# Patient Record
Sex: FEMALE | Race: OTHER | NOT HISPANIC OR LATINO | URBAN - METROPOLITAN AREA
[De-identification: names, ages, dates, MRNs, and addresses within clinical notes are randomized per-mention and may not be internally consistent; named-entity substitution may affect disease eponyms.]

---

## 2017-02-01 ENCOUNTER — OUTPATIENT (OUTPATIENT)
Dept: OUTPATIENT SERVICES | Facility: HOSPITAL | Age: 52
LOS: 1 days | Discharge: HOME | End: 2017-02-01

## 2017-06-27 DIAGNOSIS — R70.0 ELEVATED ERYTHROCYTE SEDIMENTATION RATE: ICD-10-CM

## 2017-06-27 DIAGNOSIS — D50.0 IRON DEFICIENCY ANEMIA SECONDARY TO BLOOD LOSS (CHRONIC): ICD-10-CM

## 2017-07-28 ENCOUNTER — EMERGENCY (EMERGENCY)
Facility: HOSPITAL | Age: 52
LOS: 0 days | Discharge: HOME | End: 2017-07-28
Admitting: INTERNAL MEDICINE

## 2017-07-28 DIAGNOSIS — G62.9 POLYNEUROPATHY, UNSPECIFIED: ICD-10-CM

## 2017-07-28 DIAGNOSIS — Z90.722 ACQUIRED ABSENCE OF OVARIES, BILATERAL: ICD-10-CM

## 2021-08-02 PROBLEM — Z00.00 ENCOUNTER FOR PREVENTIVE HEALTH EXAMINATION: Status: ACTIVE | Noted: 2021-08-02

## 2021-08-25 ENCOUNTER — APPOINTMENT (OUTPATIENT)
Dept: NEUROLOGY | Facility: CLINIC | Age: 56
End: 2021-08-25
Payer: COMMERCIAL

## 2021-08-25 VITALS
BODY MASS INDEX: 31.41 KG/M2 | HEART RATE: 83 BPM | SYSTOLIC BLOOD PRESSURE: 133 MMHG | WEIGHT: 184 LBS | DIASTOLIC BLOOD PRESSURE: 87 MMHG | TEMPERATURE: 97.8 F | OXYGEN SATURATION: 99 % | HEIGHT: 64 IN

## 2021-08-25 DIAGNOSIS — Z82.0 FAMILY HISTORY OF EPILEPSY AND OTHER DISEASES OF THE NERVOUS SYSTEM: ICD-10-CM

## 2021-08-25 PROCEDURE — 99203 OFFICE O/P NEW LOW 30 MIN: CPT

## 2021-08-25 NOTE — PHYSICAL EXAM
[General Appearance - Alert] : alert [General Appearance - In No Acute Distress] : in no acute distress [General Appearance - Well Nourished] : well nourished [General Appearance - Well Developed] : well developed [Oriented To Time, Place, And Person] : oriented to person, place, and time [Affect] : the affect was normal [Naming Objects] : no difficulty naming common objects [Fluency] : fluency intact [Comprehension] : comprehension intact [Cranial Nerves Optic (II)] : visual acuity intact bilaterally,  visual fields full to confrontation, pupils equal round and reactive to light [Cranial Nerves Oculomotor (III)] : extraocular motion intact [Cranial Nerves Trigeminal (V)] : facial sensation intact symmetrically [Cranial Nerves Facial (VII)] : face symmetrical [Cranial Nerves Vestibulocochlear (VIII)] : hearing was intact bilaterally [Cranial Nerves Glossopharyngeal (IX)] : tongue and palate midline [Cranial Nerves Accessory (XI - Cranial And Spinal)] : head turning and shoulder shrug symmetric [Cranial Nerves Hypoglossal (XII)] : there was no tongue deviation with protrusion [Motor Tone] : muscle tone was normal in all four extremities [Motor Strength] : muscle strength was normal in all four extremities [Motor Handedness Right-Handed] : the patient is right hand dominant [Paresis Pronator Drift Right-Sided] : no pronator drift on the right [Paresis Pronator Drift Left-Sided] : no pronator drift on the left [Motor Strength Upper Extremities Bilaterally] : strength was normal in both upper extremities [Motor Strength Lower Extremities Bilaterally] : strength was normal in both lower extremities [Sensation Tactile Decrease] : light touch was intact [Abnormal Walk] : normal gait [Coordination - Dysmetria Impaired Finger-to-Nose Bilateral] : not present [Coordination - Dysmetria Impaired Heel-to-Shin Bilateral] : not present [2+] : Patella left 2+ [Sclera] : the sclera and conjunctiva were normal [Optic Disc Abnormality] : the optic disc were normal in size and color [Hearing Threshold Finger Rub Not [place]] : hearing was normal [Neck Appearance] : the appearance of the neck was normal [] : the neck was supple [Respiration, Rhythm And Depth] : normal respiratory rhythm and effort [Auscultation Breath Sounds / Voice Sounds] : lungs were clear to auscultation bilaterally [Heart Rate And Rhythm] : heart rate was normal and rhythm regular [Heart Sounds] : normal S1 and S2 [Arterial Pulses Carotid] : carotid pulses were normal with no bruits [Edema] : there was no peripheral edema [Musculoskeletal - Swelling] : no joint swelling seen [Skin Turgor] : normal skin turgor

## 2021-11-23 ENCOUNTER — NON-APPOINTMENT (OUTPATIENT)
Age: 56
End: 2021-11-23

## 2022-01-17 ENCOUNTER — RX RENEWAL (OUTPATIENT)
Age: 57
End: 2022-01-17

## 2022-03-16 ENCOUNTER — APPOINTMENT (OUTPATIENT)
Dept: NEUROLOGY | Facility: CLINIC | Age: 57
End: 2022-03-16

## 2022-07-21 ENCOUNTER — APPOINTMENT (OUTPATIENT)
Dept: NEUROLOGY | Facility: CLINIC | Age: 57
End: 2022-07-21

## 2022-07-21 VITALS
TEMPERATURE: 97.6 F | SYSTOLIC BLOOD PRESSURE: 129 MMHG | HEIGHT: 64 IN | HEART RATE: 77 BPM | BODY MASS INDEX: 31.58 KG/M2 | OXYGEN SATURATION: 99 % | WEIGHT: 185 LBS | DIASTOLIC BLOOD PRESSURE: 86 MMHG

## 2022-07-21 PROCEDURE — 99213 OFFICE O/P EST LOW 20 MIN: CPT

## 2022-07-21 RX ORDER — ESTRADIOL 0.1 MG/G
0.1 CREAM VAGINAL
Qty: 42 | Refills: 0 | Status: ACTIVE | COMMUNITY
Start: 2022-02-23

## 2022-07-21 NOTE — DISCUSSION/SUMMARY
[FreeTextEntry1] : Pt is a 56 yo F with PMhx of migraines, who presents for evaluation/management of migraine headaches. \par \par # Episodic migraine without status migrainosus. Reports good response to ubrelvy.\par - refill script for ubrelvy 50mg prn, will need new PA\par \par RTC in 12 mo

## 2022-07-21 NOTE — PHYSICAL EXAM
[General Appearance - Alert] : alert [General Appearance - In No Acute Distress] : in no acute distress [General Appearance - Well Nourished] : well nourished [General Appearance - Well Developed] : well developed [Oriented To Time, Place, And Person] : oriented to person, place, and time [Affect] : the affect was normal [Person] : oriented to person [Place] : oriented to place [Time] : oriented to time [Fluency] : fluency intact [Comprehension] : comprehension intact [Cranial Nerves Optic (II)] : visual acuity intact bilaterally,  visual fields full to confrontation, pupils equal round and reactive to light [Cranial Nerves Oculomotor (III)] : extraocular motion intact [Cranial Nerves Trigeminal (V)] : facial sensation intact symmetrically [Cranial Nerves Facial (VII)] : face symmetrical [Cranial Nerves Vestibulocochlear (VIII)] : hearing was intact bilaterally [Cranial Nerves Glossopharyngeal (IX)] : tongue and palate midline [Cranial Nerves Accessory (XI - Cranial And Spinal)] : head turning and shoulder shrug symmetric [Cranial Nerves Hypoglossal (XII)] : there was no tongue deviation with protrusion [Motor Tone] : muscle tone was normal in all four extremities [Motor Strength] : muscle strength was normal in all four extremities [Paresis Pronator Drift Right-Sided] : no pronator drift on the right [Paresis Pronator Drift Left-Sided] : no pronator drift on the left [Motor Strength Upper Extremities Bilaterally] : strength was normal in both upper extremities [Motor Strength Lower Extremities Bilaterally] : strength was normal in both lower extremities [Sensation Tactile Decrease] : light touch was intact [Coordination - Dysmetria Impaired Finger-to-Nose Bilateral] : not present [2+] : Patella left 2+ [Sclera] : the sclera and conjunctiva were normal [PERRL With Normal Accommodation] : pupils were equal in size, round, reactive to light, with normal accommodation [Outer Ear] : the ears and nose were normal in appearance [Hearing Threshold Finger Rub Not [place]] : hearing was normal [Neck Appearance] : the appearance of the neck was normal [] : no respiratory distress [Respiration, Rhythm And Depth] : normal respiratory rhythm and effort [Heart Rate And Rhythm] : heart rate was normal and rhythm regular [Heart Sounds] : normal S1 and S2 [Arterial Pulses Carotid] : carotid pulses were normal with no bruits [Abnormal Walk] : normal gait [Skin Turgor] : normal skin turgor

## 2022-07-21 NOTE — HISTORY OF PRESENT ILLNESS
[FreeTextEntry1] : Interval History:\par Pt endorses doing well. Infrequent migraines, about 2-4/mo. Ubrelvy has been aborting them well. denies any new complaints. \par \par HPI: Pt is a 54 yo F with PMhx of migraines, who presents for evaluation/management of migraine headaches. States that she has had them for over 20 years. Describes them as behind her right eye/temple, pulsating in quality. Worse with dairy and if she misses meals. Migraines have improved since cutting out dairy from her diet. They occur about 3 times a month, last less than a day. She has tried fioricet but doesn't like to take it more than once every other week. She recently tried ubrelvy and it was quite helpful without any side effects. Denies weakness, numbness, vision changes, speech difficulty or falls.

## 2022-07-25 ENCOUNTER — TRANSCRIPTION ENCOUNTER (OUTPATIENT)
Age: 57
End: 2022-07-25

## 2022-11-28 NOTE — HISTORY OF PRESENT ILLNESS
[2+] : left 2+ [Ankle Swelling (On Exam)] : present [FreeTextEntry1] : Pt is a 56 yo F with PMhx of migraines, who presents for evaluation/management of migraine headaches. States that she has had them for over 20 years. Describes them as behind her right eye/temple, pulsating in quality. Worse with dairy and if she misses meals. Migraines have improved since cutting out dairy from her diet. They occur about 3 times a month, last less than a day. She has tried fioricet but doesn't like to take it more than once every other week. She recently tried ubrelvy and it was quite helpful without any side effects. Denies weakness, numbness, vision changes, speech difficulty or falls.  [Varicose Veins Of Lower Extremities] : present [Varicose Veins Of The Right Leg] : of the right leg [Ankle Swelling On The Left] : moderate [] : of the left leg [Ankle Swelling Bilaterally] : severe [Alert] : alert [Oriented to Person] : oriented to person [Oriented to Place] : oriented to place [Oriented to Time] : oriented to time [Calm] : calm [Skin Ulcer] : no ulcer [de-identified] : NAD. well appearing

## 2023-02-21 ENCOUNTER — RX RENEWAL (OUTPATIENT)
Age: 58
End: 2023-02-21

## 2023-07-21 ENCOUNTER — APPOINTMENT (OUTPATIENT)
Dept: NEUROLOGY | Facility: CLINIC | Age: 58
End: 2023-07-21
Payer: COMMERCIAL

## 2023-07-21 VITALS
HEART RATE: 79 BPM | WEIGHT: 190 LBS | OXYGEN SATURATION: 99 % | HEIGHT: 64 IN | TEMPERATURE: 97.5 F | SYSTOLIC BLOOD PRESSURE: 133 MMHG | DIASTOLIC BLOOD PRESSURE: 83 MMHG | BODY MASS INDEX: 32.44 KG/M2

## 2023-07-21 DIAGNOSIS — G43.909 MIGRAINE, UNSPECIFIED, NOT INTRACTABLE, W/OUT STATUS MIGRAINOSUS: ICD-10-CM

## 2023-07-21 PROCEDURE — 99213 OFFICE O/P EST LOW 20 MIN: CPT

## 2023-07-21 NOTE — HISTORY OF PRESENT ILLNESS
[FreeTextEntry1] : Interval History:\par Pt endorses feeling well, uses migraine abortive approx once a week. Denies any recent changes to migraine severity/quality/frequency.\par \par HPI: Pt is a 54 yo F with PMhx of migraines, who presents for evaluation/management of migraine headaches. States that she has had them for over 20 years. Describes them as behind her right eye/temple, pulsating in quality. Worse with dairy and if she misses meals. Migraines have improved since cutting out dairy from her diet. They occur about 3 times a month, last less than a day. She has tried fioricet but doesn't like to take it more than once every other week. She recently tried ubrelvy and it was quite helpful without any side effects. Denies weakness, numbness, vision changes, speech difficulty or falls. \par \par 07/2022: Pt endorses doing well. Infrequent migraines, about 2-4/mo. Ubrelvy has been aborting them well. denies any new complaints

## 2023-07-21 NOTE — DISCUSSION/SUMMARY
[FreeTextEntry1] : Pt is a 58 yo F with PMhx of migraines, who presents for migraine headache f/u. \par \par # Episodic migraine without status migrainosus: continued good response to ubrelvy.\par - refill script for ubrelvy 50mg prn\par \par \par RTC in 12 mo.

## 2023-07-21 NOTE — PHYSICAL EXAM
[General Appearance - Alert] : alert [General Appearance - In No Acute Distress] : in no acute distress [General Appearance - Well Nourished] : well nourished [General Appearance - Well Developed] : well developed [Oriented To Time, Place, And Person] : oriented to person, place, and time [Affect] : the affect was normal [Person] : oriented to person [Place] : oriented to place [Time] : oriented to time [Fluency] : fluency intact [Comprehension] : comprehension intact [Cranial Nerves Optic (II)] : visual acuity intact bilaterally,  visual fields full to confrontation, pupils equal round and reactive to light [Cranial Nerves Oculomotor (III)] : extraocular motion intact [Cranial Nerves Trigeminal (V)] : facial sensation intact symmetrically [Cranial Nerves Facial (VII)] : face symmetrical [Cranial Nerves Vestibulocochlear (VIII)] : hearing was intact bilaterally [Cranial Nerves Glossopharyngeal (IX)] : tongue and palate midline [Cranial Nerves Accessory (XI - Cranial And Spinal)] : head turning and shoulder shrug symmetric [Cranial Nerves Hypoglossal (XII)] : there was no tongue deviation with protrusion [Motor Tone] : muscle tone was normal in all four extremities [Motor Strength] : muscle strength was normal in all four extremities [Sensation Tactile Decrease] : light touch was intact [Abnormal Walk] : normal gait [Coordination - Dysmetria Impaired Finger-to-Nose Bilateral] : not present [2+] : Patella left 2+

## 2023-07-24 RX ORDER — UBROGEPANT 50 MG/1
TABLET ORAL
Qty: 48 | Refills: 3 | Status: ACTIVE | COMMUNITY
Start: 2021-08-25 | End: 1900-01-01

## 2024-07-19 ENCOUNTER — NON-APPOINTMENT (OUTPATIENT)
Age: 59
End: 2024-07-19

## 2024-07-19 ENCOUNTER — APPOINTMENT (OUTPATIENT)
Dept: NEUROLOGY | Facility: CLINIC | Age: 59
End: 2024-07-19
Payer: COMMERCIAL

## 2024-07-19 VITALS
BODY MASS INDEX: 33.63 KG/M2 | RESPIRATION RATE: 18 BRPM | HEIGHT: 64 IN | OXYGEN SATURATION: 99 % | DIASTOLIC BLOOD PRESSURE: 72 MMHG | TEMPERATURE: 97.5 F | WEIGHT: 197 LBS | HEART RATE: 80 BPM | SYSTOLIC BLOOD PRESSURE: 105 MMHG

## 2024-07-19 DIAGNOSIS — G43.909 MIGRAINE, UNSPECIFIED, NOT INTRACTABLE, W/OUT STATUS MIGRAINOSUS: ICD-10-CM

## 2024-07-19 PROCEDURE — 99213 OFFICE O/P EST LOW 20 MIN: CPT

## 2025-05-30 ENCOUNTER — APPOINTMENT (OUTPATIENT)
Facility: CLINIC | Age: 60
End: 2025-05-30

## 2025-05-30 DIAGNOSIS — M25.561 PAIN IN RIGHT KNEE: ICD-10-CM

## 2025-05-30 DIAGNOSIS — M25.511 PAIN IN RIGHT SHOULDER: ICD-10-CM

## 2025-05-30 PROCEDURE — 73562 X-RAY EXAM OF KNEE 3: CPT | Mod: 50

## 2025-05-30 PROCEDURE — 99204 OFFICE O/P NEW MOD 45 MIN: CPT | Mod: 25

## 2025-05-30 PROCEDURE — 73030 X-RAY EXAM OF SHOULDER: CPT | Mod: RT

## 2025-05-30 RX ORDER — MELOXICAM 15 MG/1
15 TABLET ORAL
Qty: 30 | Refills: 0 | Status: ACTIVE | COMMUNITY
Start: 2025-05-30 | End: 1900-01-01

## 2025-06-26 ENCOUNTER — APPOINTMENT (OUTPATIENT)
Dept: ORTHOPEDIC SURGERY | Facility: CLINIC | Age: 60
End: 2025-06-26

## 2025-07-31 ENCOUNTER — APPOINTMENT (OUTPATIENT)
Dept: ORTHOPEDIC SURGERY | Facility: CLINIC | Age: 60
End: 2025-07-31

## 2025-09-04 ENCOUNTER — APPOINTMENT (OUTPATIENT)
Dept: ORTHOPEDIC SURGERY | Facility: CLINIC | Age: 60
End: 2025-09-04
Payer: COMMERCIAL

## 2025-09-04 DIAGNOSIS — M25.561 PAIN IN RIGHT KNEE: ICD-10-CM

## 2025-09-04 PROCEDURE — 99213 OFFICE O/P EST LOW 20 MIN: CPT

## 2025-09-05 ENCOUNTER — EMERGENCY (EMERGENCY)
Facility: HOSPITAL | Age: 60
LOS: 0 days | Discharge: HOME | End: 2025-09-06
Attending: EMERGENCY MEDICINE
Payer: COMMERCIAL

## 2025-09-05 VITALS
DIASTOLIC BLOOD PRESSURE: 69 MMHG | RESPIRATION RATE: 18 BRPM | HEART RATE: 107 BPM | OXYGEN SATURATION: 99 % | WEIGHT: 195.33 LBS | HEIGHT: 63 IN | TEMPERATURE: 98 F | SYSTOLIC BLOOD PRESSURE: 118 MMHG

## 2025-09-05 VITALS
TEMPERATURE: 98 F | OXYGEN SATURATION: 98 % | SYSTOLIC BLOOD PRESSURE: 119 MMHG | HEART RATE: 85 BPM | RESPIRATION RATE: 18 BRPM | DIASTOLIC BLOOD PRESSURE: 77 MMHG

## 2025-09-05 DIAGNOSIS — K52.9 NONINFECTIVE GASTROENTERITIS AND COLITIS, UNSPECIFIED: ICD-10-CM

## 2025-09-05 DIAGNOSIS — R10.84 GENERALIZED ABDOMINAL PAIN: ICD-10-CM

## 2025-09-05 LAB
ALBUMIN SERPL ELPH-MCNC: 4.9 G/DL — SIGNIFICANT CHANGE UP (ref 3.5–5.2)
ALP SERPL-CCNC: 90 U/L — SIGNIFICANT CHANGE UP (ref 30–115)
ALT FLD-CCNC: 19 U/L — SIGNIFICANT CHANGE UP (ref 0–41)
ANION GAP SERPL CALC-SCNC: 13 MMOL/L — SIGNIFICANT CHANGE UP (ref 7–14)
APPEARANCE UR: CLEAR — SIGNIFICANT CHANGE UP
APTT BLD: 28.3 SEC — SIGNIFICANT CHANGE UP (ref 27–39.2)
AST SERPL-CCNC: 21 U/L — SIGNIFICANT CHANGE UP (ref 0–41)
BASOPHILS # BLD AUTO: 0.05 K/UL — SIGNIFICANT CHANGE UP (ref 0–0.2)
BASOPHILS NFR BLD AUTO: 0.4 % — SIGNIFICANT CHANGE UP (ref 0–1)
BILIRUB SERPL-MCNC: 0.6 MG/DL — SIGNIFICANT CHANGE UP (ref 0.2–1.2)
BILIRUB UR-MCNC: NEGATIVE — SIGNIFICANT CHANGE UP
BUN SERPL-MCNC: 8 MG/DL — LOW (ref 10–20)
CALCIUM SERPL-MCNC: 9.6 MG/DL — SIGNIFICANT CHANGE UP (ref 8.4–10.5)
CHLORIDE SERPL-SCNC: 101 MMOL/L — SIGNIFICANT CHANGE UP (ref 98–110)
CO2 SERPL-SCNC: 25 MMOL/L — SIGNIFICANT CHANGE UP (ref 17–32)
COLOR SPEC: YELLOW — SIGNIFICANT CHANGE UP
CREAT SERPL-MCNC: 0.5 MG/DL — LOW (ref 0.7–1.5)
DIFF PNL FLD: ABNORMAL
EGFR: 109 ML/MIN/1.73M2 — SIGNIFICANT CHANGE UP
EGFR: 109 ML/MIN/1.73M2 — SIGNIFICANT CHANGE UP
EOSINOPHIL # BLD AUTO: 0.07 K/UL — SIGNIFICANT CHANGE UP (ref 0–0.7)
EOSINOPHIL NFR BLD AUTO: 0.5 % — SIGNIFICANT CHANGE UP (ref 0–8)
GLUCOSE SERPL-MCNC: 91 MG/DL — SIGNIFICANT CHANGE UP (ref 70–99)
GLUCOSE UR QL: NEGATIVE MG/DL — SIGNIFICANT CHANGE UP
HCT VFR BLD CALC: 40.5 % — SIGNIFICANT CHANGE UP (ref 37–47)
HGB BLD-MCNC: 13.6 G/DL — SIGNIFICANT CHANGE UP (ref 12–16)
IMM GRANULOCYTES NFR BLD AUTO: 0.2 % — SIGNIFICANT CHANGE UP (ref 0.1–0.3)
INR BLD: 0.95 RATIO — SIGNIFICANT CHANGE UP (ref 0.65–1.3)
KETONES UR QL: ABNORMAL MG/DL
LACTATE SERPL-SCNC: 0.8 MMOL/L — SIGNIFICANT CHANGE UP (ref 0.7–2)
LEUKOCYTE ESTERASE UR-ACNC: ABNORMAL
LIDOCAIN IGE QN: 18 U/L — SIGNIFICANT CHANGE UP (ref 7–60)
LYMPHOCYTES # BLD AUTO: 24.1 % — SIGNIFICANT CHANGE UP (ref 20.5–51.1)
LYMPHOCYTES # BLD AUTO: 3.32 K/UL — SIGNIFICANT CHANGE UP (ref 1.2–3.4)
MCHC RBC-ENTMCNC: 29.4 PG — SIGNIFICANT CHANGE UP (ref 27–31)
MCHC RBC-ENTMCNC: 33.6 G/DL — SIGNIFICANT CHANGE UP (ref 32–37)
MCV RBC AUTO: 87.5 FL — SIGNIFICANT CHANGE UP (ref 81–99)
MONOCYTES # BLD AUTO: 0.78 K/UL — HIGH (ref 0.1–0.6)
MONOCYTES NFR BLD AUTO: 5.7 % — SIGNIFICANT CHANGE UP (ref 1.7–9.3)
NEUTROPHILS # BLD AUTO: 9.5 K/UL — HIGH (ref 1.4–6.5)
NEUTROPHILS NFR BLD AUTO: 69.1 % — SIGNIFICANT CHANGE UP (ref 42.2–75.2)
NITRITE UR-MCNC: NEGATIVE — SIGNIFICANT CHANGE UP
NRBC BLD AUTO-RTO: 0 /100 WBCS — SIGNIFICANT CHANGE UP (ref 0–0)
PH UR: 6 — SIGNIFICANT CHANGE UP (ref 5–8)
PLATELET # BLD AUTO: 386 K/UL — SIGNIFICANT CHANGE UP (ref 130–400)
PMV BLD: 10 FL — SIGNIFICANT CHANGE UP (ref 7.4–10.4)
POTASSIUM SERPL-MCNC: 3.8 MMOL/L — SIGNIFICANT CHANGE UP (ref 3.5–5)
POTASSIUM SERPL-SCNC: 3.8 MMOL/L — SIGNIFICANT CHANGE UP (ref 3.5–5)
PROT SERPL-MCNC: 7.1 G/DL — SIGNIFICANT CHANGE UP (ref 6–8)
PROT UR-MCNC: NEGATIVE MG/DL — SIGNIFICANT CHANGE UP
PROTHROM AB SERPL-ACNC: 11.2 SEC — SIGNIFICANT CHANGE UP (ref 9.95–12.87)
RBC # BLD: 4.63 M/UL — SIGNIFICANT CHANGE UP (ref 4.2–5.4)
RBC # FLD: 12.3 % — SIGNIFICANT CHANGE UP (ref 11.5–14.5)
SODIUM SERPL-SCNC: 139 MMOL/L — SIGNIFICANT CHANGE UP (ref 135–146)
SP GR SPEC: 1.02 — SIGNIFICANT CHANGE UP (ref 1–1.03)
UROBILINOGEN FLD QL: 0.2 MG/DL — SIGNIFICANT CHANGE UP (ref 0.2–1)
WBC # BLD: 13.75 K/UL — HIGH (ref 4.8–10.8)
WBC # FLD AUTO: 13.75 K/UL — HIGH (ref 4.8–10.8)

## 2025-09-05 PROCEDURE — 99285 EMERGENCY DEPT VISIT HI MDM: CPT

## 2025-09-05 PROCEDURE — 83605 ASSAY OF LACTIC ACID: CPT

## 2025-09-05 PROCEDURE — 74174 CTA ABD&PLVS W/CONTRAST: CPT | Mod: 26

## 2025-09-05 PROCEDURE — 81001 URINALYSIS AUTO W/SCOPE: CPT

## 2025-09-05 PROCEDURE — 83690 ASSAY OF LIPASE: CPT

## 2025-09-05 PROCEDURE — 96375 TX/PRO/DX INJ NEW DRUG ADDON: CPT | Mod: XU

## 2025-09-05 PROCEDURE — 85730 THROMBOPLASTIN TIME PARTIAL: CPT

## 2025-09-05 PROCEDURE — 80053 COMPREHEN METABOLIC PANEL: CPT

## 2025-09-05 PROCEDURE — 99284 EMERGENCY DEPT VISIT MOD MDM: CPT | Mod: 25

## 2025-09-05 PROCEDURE — 74174 CTA ABD&PLVS W/CONTRAST: CPT

## 2025-09-05 PROCEDURE — 85025 COMPLETE CBC W/AUTO DIFF WBC: CPT

## 2025-09-05 PROCEDURE — 36415 COLL VENOUS BLD VENIPUNCTURE: CPT

## 2025-09-05 PROCEDURE — 96374 THER/PROPH/DIAG INJ IV PUSH: CPT | Mod: XU

## 2025-09-05 PROCEDURE — 86850 RBC ANTIBODY SCREEN: CPT

## 2025-09-05 PROCEDURE — 87086 URINE CULTURE/COLONY COUNT: CPT

## 2025-09-05 PROCEDURE — 86900 BLOOD TYPING SEROLOGIC ABO: CPT

## 2025-09-05 PROCEDURE — 86901 BLOOD TYPING SEROLOGIC RH(D): CPT

## 2025-09-05 PROCEDURE — 85610 PROTHROMBIN TIME: CPT

## 2025-09-05 RX ORDER — METRONIDAZOLE 250 MG
1 TABLET ORAL
Qty: 21 | Refills: 0
Start: 2025-09-05 | End: 2025-09-11

## 2025-09-05 RX ORDER — CIPROFLOXACIN HCL 250 MG
1 TABLET ORAL
Qty: 14 | Refills: 0
Start: 2025-09-05 | End: 2025-09-11

## 2025-09-05 RX ORDER — CIPROFLOXACIN HCL 250 MG
400 TABLET ORAL ONCE
Refills: 0 | Status: COMPLETED | OUTPATIENT
Start: 2025-09-05 | End: 2025-09-05

## 2025-09-05 RX ORDER — METRONIDAZOLE 250 MG
500 TABLET ORAL ONCE
Refills: 0 | Status: COMPLETED | OUTPATIENT
Start: 2025-09-05 | End: 2025-09-05

## 2025-09-05 RX ADMIN — Medication 200 MILLIGRAM(S): at 21:38

## 2025-09-05 RX ADMIN — Medication 100 MILLIGRAM(S): at 22:46

## 2025-09-05 RX ADMIN — Medication 1000 MILLILITER(S): at 19:39
